# Patient Record
Sex: FEMALE | Race: WHITE
[De-identification: names, ages, dates, MRNs, and addresses within clinical notes are randomized per-mention and may not be internally consistent; named-entity substitution may affect disease eponyms.]

---

## 2019-01-16 ENCOUNTER — HOSPITAL ENCOUNTER (OUTPATIENT)
Dept: HOSPITAL 84 - D.OPS | Age: 74
Discharge: HOME | End: 2019-01-16
Attending: INTERNAL MEDICINE
Payer: MEDICARE

## 2019-01-16 VITALS — BODY MASS INDEX: 28.02 KG/M2 | HEIGHT: 66 IN | BODY MASS INDEX: 28.02 KG/M2 | WEIGHT: 174.37 LBS

## 2019-01-16 VITALS — DIASTOLIC BLOOD PRESSURE: 53 MMHG | SYSTOLIC BLOOD PRESSURE: 137 MMHG

## 2019-01-16 DIAGNOSIS — K57.30: ICD-10-CM

## 2019-01-16 DIAGNOSIS — K64.0: ICD-10-CM

## 2019-01-16 DIAGNOSIS — Z12.11: Primary | ICD-10-CM

## 2019-01-16 DIAGNOSIS — Z01.812: ICD-10-CM

## 2019-01-16 DIAGNOSIS — K63.5: ICD-10-CM

## 2019-01-16 LAB
ANION GAP SERPL CALC-SCNC: 12.9 MMOL/L (ref 8–16)
BASOPHILS NFR BLD AUTO: 0.1 % (ref 0–2)
BUN SERPL-MCNC: 15 MG/DL (ref 7–18)
CALCIUM SERPL-MCNC: 9.1 MG/DL (ref 8.5–10.1)
CHLORIDE SERPL-SCNC: 103 MMOL/L (ref 98–107)
CO2 SERPL-SCNC: 29.2 MMOL/L (ref 21–32)
CREAT SERPL-MCNC: 0.7 MG/DL (ref 0.6–1.3)
EOSINOPHIL NFR BLD: 1.4 % (ref 0–7)
ERYTHROCYTE [DISTWIDTH] IN BLOOD BY AUTOMATED COUNT: 13.2 % (ref 11.5–14.5)
GLUCOSE SERPL-MCNC: 125 MG/DL (ref 74–106)
HCT VFR BLD CALC: 42.8 % (ref 36–48)
HGB BLD-MCNC: 14 G/DL (ref 12–16)
IMM GRANULOCYTES NFR BLD: 0.1 % (ref 0–5)
LYMPHOCYTES NFR BLD AUTO: 12.8 % (ref 15–50)
MCH RBC QN AUTO: 26.8 PG (ref 26–34)
MCHC RBC AUTO-ENTMCNC: 32.7 G/DL (ref 31–37)
MCV RBC: 81.8 FL (ref 80–100)
MONOCYTES NFR BLD: 5 % (ref 2–11)
NEUTROPHILS NFR BLD AUTO: 80.6 % (ref 40–80)
OSMOLALITY SERPL CALC.SUM OF ELEC: 282 MOSM/KG (ref 275–300)
PLATELET # BLD: 199 10X3/UL (ref 130–400)
PMV BLD AUTO: 9.8 FL (ref 7.4–10.4)
POTASSIUM SERPL-SCNC: 4.1 MMOL/L (ref 3.5–5.1)
RBC # BLD AUTO: 5.23 10X6/UL (ref 4–5.4)
SODIUM SERPL-SCNC: 141 MMOL/L (ref 136–145)
WBC # BLD AUTO: 7.7 10X3/UL (ref 4.8–10.8)

## 2019-01-16 NOTE — NUR
PT STATED THAT SHE HAD PREVIOUS REACTION TO ANESTHESIA IN 1980.
ANESTHESIA CALLED AT THIS TIME AND INFORMED OF PREVIOUS REACTION.

## 2019-01-21 NOTE — OP
PATIENT NAME:  SONAM LEWIS                       MEDICAL RECORD: [id number]
:43                                             LOCATION:D.OPS          
                                                         ADMISSION DATE:        
SURGEON:  HENNY MCCULLOUGH DO             
 
 
DATE OF OPERATION:  2019
 
PROCEDURE:  Colonoscopy with polypectomy.
 
INDICATIONS FOR PROCEDURE:  Screening for colorectal cancer.
 
SCOPE:  Olympus video pediatric colonoscope.
 
MEDICATIONS:  Propofol 280 mg IV per anesthesia.
 
WITHDRAWAL TIME:  14 minutes.
 
ESTIMATED BLOOD LOSS:  Minimal.
 
COMPLICATIONS:  None.
 
FINDINGS:  Informed consent was given.  The patient was made comfortable with
the above medication.  After reaching an adequate level of sedation by slow IV
push, the patient was placed on her left side.  A digital rectal examination was
performed and was normal.  The endoscope was then advanced under direct
visualization through the rectum to the terminal ileum.  The endoscope was
slowly withdrawn and mucosa was carefully examined.  The prep quality was good. 
There was a single polyp visualized on today's examination.  It was a benign
appearing sessile polyp, which measured approximately 4 mm in diameter.  It was
located in the sigmoid colon.  It was removed using hot forceps.  There were
scattered diverticula throughout the entire colon.  There was no evidence of
diverticulitis.  The severity of diverticulosis was mild.  Retroflexion was
performed in the rectum with visualization of grade I internal hemorrhoids
without bleeding.  The endoscope was withdrawn from the patient.  The patient
tolerated the procedure well and there were no complications.
 
IMPRESSION:
1.  Mild diverticulosis scattered throughout the entire colon.
2.  A single benign-appearing polyp in the sigmoid colon, removed using hot
forceps.
3.  Grade I internal hemorrhoids without bleeding.
 
PLAN AND RECOMMENDATIONS:
1.  Discharge home when recovery parameters are met.
2.  Follow up biopsy specimen results.
3.  High fiber diet.
4.  Continue current medications.
5.  Recall colonoscopy in 5 years.
 
TRANSINT:[id number] Voice Confirmation ID: [id number] DOCUMENT ID: [id number]
 
 
 
OPERATIVE REPORT                               [id number]    SONAM LEWIS     
 
 
                                           
                                           HENNY MCCULLOUGH DO             
 
 
 
Electronically Signed by HENNY PALUMBO on 19 at 1043
 
 
 
 
 
 
 
 
 
 
 
 
 
 
 
 
 
 
 
 
 
 
 
 
 
 
 
 
 
 
 
 
 
 
 
 
 
 
 
 
 
CC:                                                             [id number]
DICTATION DATE: 19 0929     :     19 1215      [facility] 
                                                                      [address]
[place] [address]